# Patient Record
Sex: MALE | Race: ASIAN | Employment: FULL TIME | URBAN - METROPOLITAN AREA
[De-identification: names, ages, dates, MRNs, and addresses within clinical notes are randomized per-mention and may not be internally consistent; named-entity substitution may affect disease eponyms.]

---

## 2019-01-02 ENCOUNTER — OFFICE VISIT (OUTPATIENT)
Dept: FAMILY MEDICINE | Facility: CLINIC | Age: 24
End: 2019-01-02
Payer: COMMERCIAL

## 2019-01-02 VITALS
HEART RATE: 68 BPM | WEIGHT: 126 LBS | DIASTOLIC BLOOD PRESSURE: 53 MMHG | TEMPERATURE: 96.5 F | BODY MASS INDEX: 20.97 KG/M2 | OXYGEN SATURATION: 95 % | SYSTOLIC BLOOD PRESSURE: 102 MMHG

## 2019-01-02 DIAGNOSIS — L30.8 OTHER ECZEMA: ICD-10-CM

## 2019-01-02 DIAGNOSIS — J30.89 SEASONAL ALLERGIC RHINITIS DUE TO OTHER ALLERGIC TRIGGER: Primary | ICD-10-CM

## 2019-01-02 DIAGNOSIS — J31.0 CHRONIC RHINITIS: ICD-10-CM

## 2019-01-02 DIAGNOSIS — J33.9 NASAL POLYP: ICD-10-CM

## 2019-01-02 PROCEDURE — 99214 OFFICE O/P EST MOD 30 MIN: CPT | Performed by: FAMILY MEDICINE

## 2019-01-02 RX ORDER — TRIAMCINOLONE ACETONIDE 1 MG/G
CREAM TOPICAL
Qty: 80 G | Refills: 0 | Status: SHIPPED | OUTPATIENT
Start: 2019-01-02

## 2019-01-02 RX ORDER — FLUTICASONE PROPIONATE 50 MCG
1 SPRAY, SUSPENSION (ML) NASAL DAILY
Qty: 3 BOTTLE | Refills: 3 | Status: SHIPPED | OUTPATIENT
Start: 2019-01-02 | End: 2020-01-02

## 2019-01-02 NOTE — PATIENT INSTRUCTIONS
Patient Education     Treatment for Nasal Polyps    Nasal polyps are abnormal, soft growths in the nose or sinuses. They are swollen bulbs of inflamed tissue, attached to the nasal lining by thin stalks. Nasal polyps are fairly common, especially as you get older. Polyps can cause symptoms such as:     Nasal congestion    Runny nose    Feeling of fullness in  your facial sinus, but usually not pain    Postnasal drip    Reduced smell    Feeling blocked in your nose and having to breathe through your mouth  Types of treatment  Treatment is done to reduce inflammation and the size of your polyps. Treatment often starts with steroid medicine inhaled through the nose. This can lessen the inflammation in your nose. If this doesn t work, you may need to take oral steroid medicine.  Other treatments for nasal polyps include:    Antileukotriene medicine, to help lessen inflammation    Antibiotics, to help reduce polyp size    Daily rinsing of the sinuses with a salt water solution    Antihistamines, to reduce allergic reactions    Allergen immunotherapy and removal of allergens, if possible    Aspirin desensitization therapy, if needed  You may still have symptoms despite treatment. If this is the case, you may need surgery to remove the polyps. This makes most symptoms go away. But polyps may come back in a few months to a few years. It is important to treat the cause of your polyps to help prevent them from growing back. After surgery, you may need to take inhaled nasal steroids to help keep the polyps from returning.  Possible complications of nasal polyps  Sinus infection is a common complication of nasal polyps. These infections may come back often and become chronic. If you get a bacterial infection, you may need treatment with antibiotics.  In rare cases, nasal polyps can lead to serious infections such as:    Infection of the tissue around the brain and spinal cord (meningitis)    Infection around the tissue around  the eye (orbital cellulitis)    Infection of the sinus bones (osteitis)  Your healthcare provider will watch your symptoms carefully to make sure you don t have these infections. If you do, you might need antibiotics. In very rare instances, you may need surgery.  Very large nasal polyps may block your nasal airways during sleep. This leads to obstructive sleep apnea, a condition where lack of oxygen wakes you up many times a night without you knowing it. This can make you very tired during the day. Let your healthcare provider know if you often feel tired.     When to call the healthcare provider  Call your healthcare provider right away if you have any of these:    Symptoms that don t get better after several days of treatment    Abnormal vision    Swelling around your eyes    Confusion or loss of alertness   Date Last Reviewed: 11/1/2017 2000-2018 [email or web address]. [address] [place] [id number]. All rights reserved. This information is not intended as a substitute for professional medical care. Always follow your healthcare professional's instructions.           Patient Education     Understanding Nasal Polyps    Nasal polyps are abnormal, soft growths in the nose or sinuses. They are swollen bulbs of inflamed tissue, attached to the nasal lining by thin stalks. Nasal polyps are fairly common, especially as you get older.  How polyps affect the sinuses and nasal cavity  The sinuses are a group of air-filled spaces formed by the bones of your face. They connect with the nasal cavity. This is the large space behind your nose. Normally, all of these spaces are fairly open and air flows freely. But nasal polyps can grow and block the space. This can make it hard to breathe through your nose.  Nasal polyps are a result of ongoing (chronic) rhinosinusitis. This is a condition in which the nasal cavity and sinuses are inflamed for 12 weeks or longer. But not all people with this condition will  develop nasal polyps.  What causes nasal polyps?  Researchers are still learning about the causes of nasal polyps. Inflammation of your nasal tissue is part of the cause. Nasal polyps are more common in people with health conditions such as:    Asthma    Aspirin sensitivity    Chronic sinus infections    Cystic fibrosis    Hay fever (allergic rhinitis)  Certain genes may also cause nasal polyps to grow. This includes genes that play a role in your immune system and inflammatory response. You may be more likely to develop nasal polyps if other members of your family have had them.  Symptoms of nasal polyps  If you have nasal polyps, you may feel like you have a cold for months or longer. Some of your symptoms may be because of nasal polyps. Others may result from the chronic rhinosinusitis that caused your polyps.  The most common symptoms of nasal polyps include:    Nasal congestion    Runny nose    Feeling of fullness in  your facial sinus, but usually not pain    Postnasal drip    Reduced smell    Feeling blocked in your nose and having to breathe through your mouth  Diagnosing nasal polyps  Your healthcare provider will ask about your health history and symptoms. He or she will look inside your nose with a lighted tool. The polyps may be seen with this simple exam. Your healthcare provider may refer you to an ear, nose, and throat doctor (otolaryngologist).  Your healthcare provider might need more information about your sinuses and nasal cavity. He or she may want to diagnose the trigger of your polyps, such as allergies. You may need tests such as:    Nasal endoscopy, to look more closely at your inner nose and your sinuses. This is done with a thin, flexible tube with a light on the end. It s inserted into your nose to give a detailed view of your polyps.    CT scan, if the diagnosis isn t clear. X-rays pass through your nose and create images that are put together by a computer.    MRI, if needed. This uses  strong magnets to create an image of tissues inside your body.    Allergy testing, to diagnose allergies    Tests to diagnose the airflow in the nasal cavity    Polyp biopsy, if needed to rule out cancer. A polyp or small piece of a polyp is removed and checked for cancer cells.  Date Last Reviewed: 11/1/2017 2000-2018 [employer]. [address] [place] [address], [place] [address]. All rights reserved. This information is not intended as a substitute for professional medical care. Always follow your healthcare professional's instructions.

## 2019-01-02 NOTE — PROGRESS NOTES
SUBJECTIVE:                                                    Beckie Andrea is a 23 year old male who presents to clinic today for the following health issues:  Patient comes in today with concern of runny nose, he reports he does have history of sneezing and seasonal allergy.  However his runny nose is always been running some nasal congestion.  Nasal pressure.  He denies any bleeding he does report sneezing coughing sometimes.  He reports that previously when he took Claritin-D his runny nose and sneezing was better.    He denies being lightheaded denies dizzy has no postnasal drainage.    He also also reports a dry scaling rash, itching she has been having for over a year to his right upper hip area.  He denies any fever or chills.  Acute Illness   Acute illness concerns: URI Symptoms  Onset: Ongoing    Fever: no    Chills/Sweats: no    Headache (location?): no    Sinus Pressure:Yes    Conjunctivitis:  no    Ear Pain: no    Rhinorrhea: YES    Congestion: YES    Sore Throat: no     Cough: no    Wheeze: no    Decreased Appetite: no    Nausea: no    Vomiting: no    Diarrhea:  no    Dysuria/Freq.: no    Fatigue/Achiness: no    Sick/Strep Exposure: no     Therapies Tried and outcome: Nothing      Patient would like a refill on Loratadine.    Problem list and histories reviewed & adjusted, as indicated.  Additional history: as documented    Patient Active Problem List   Diagnosis     Fracture of medial epicondyle of humerus - left     Insomnia     Chronic rhinitis     History of BCG vaccination     Past Surgical History:   Procedure Laterality Date     C OPEN TX HUMERAL EPICONDYLAR FRACTURE  1/14/2011    ORIF Lt elbow epicondylar fx     HC REMOVAL DEEP IMPLANT  10/06/2011    Removal retained hardware healed Lt elbow ORIF     REMOVE HARDWARE UPPER EXTREMITY  10/6/2011    Procedure:REMOVE HARDWARE UPPER EXTREMITY; Hardware removal left elbow, mini c-arm; Surgeon:MATTHEW MANDUJANO FELICIA; Location: OR       Social History      Tobacco Use     Smoking status: Never Smoker     Smokeless tobacco: Never Used     Tobacco comment: nonsmoking household   Substance Use Topics     Alcohol use: No     Family History   Problem Relation Age of Onset     Heart Disease Father      Hypertension Father          Current Outpatient Medications   Medication Sig Dispense Refill     fluticasone (FLONASE) 50 MCG/ACT nasal spray Spray 1 spray into both nostrils daily 3 Bottle 3     ibuprofen (ADVIL,MOTRIN) 600 MG tablet Take 1 tablet (600 mg) by mouth every 6 hours as needed for moderate pain 30 tablet 1     loratadine-pseudoePHEDrine (CLARITIN-D 24-HOUR)  MG 24 hr tablet Take 1 tablet by mouth daily 90 tablet 3     triamcinolone (KENALOG) 0.1 % external cream Apply to area bid - tid for 7 days, then as needed 80 g 0     order for DME Equipment being ordered: air cast ankle support (Patient not taking: Reported on 1/2/2019) 1 each 0     Allergies   Allergen Reactions     Seasonal Allergies      Runny nose       ROS:  Constitutional, HEENT, cardiovascular, pulmonary, gi and gu systems are negative, except as otherwise noted.    OBJECTIVE:     /53 (BP Location: Left arm, Patient Position: Chair, Cuff Size: Adult Regular)   Pulse 68   Temp 96.5  F (35.8  C) (Oral)   Wt 57.2 kg (126 lb)   SpO2 95%   BMI 20.97 kg/m    Body mass index is 20.97 kg/m .  GENERAL: healthy, alert and no distress  HENT: normal cephalic/atraumatic, ear canals and TM's normal, nasal mucosa edematous , rhinorrhea yellow, oropharynx clear and oral mucous membranes moist  NECK: no adenopathy, no asymmetry, masses, or scars and thyroid normal to palpation  RESP: lungs clear to auscultation - no rales, rhonchi or wheezes  CV: regular rate and rhythm, normal S1 S2, no S3 or S4, no murmur, click or rub, no peripheral edema and peripheral pulses strong  SKIN: dry, scaling patch of dry , rough skin 5 cm by 6 cm on right upper hip area. No erythematous     Diagnostic Test  Results:  none     ASSESSMENT/PLAN:       ICD-10-CM    1. Seasonal allergic rhinitis due to other allergic trigger J30.89    2. Nasal polyp J33.9 OTOLARYNGOLOGY REFERRAL   3. Chronic rhinitis J31.0 loratadine-pseudoePHEDrine (CLARITIN-D 24-HOUR)  MG 24 hr tablet     fluticasone (FLONASE) 50 MCG/ACT nasal spray   4. Other eczema L30.8 triamcinolone (KENALOG) 0.1 % external cream   #1 seasonal allergic, runny nose.  With fullness and pressure.  I did advise the patient symptom could be related to nasal polyps as well.  I did renew his prescription for Claritin-D.  I did refer him to ENT for evaluation and management.  In addition, given prescription for Flonase use has been prescribed.    2.  Eczema dermatitis.  He was given a prescription for Kenalog cream that has been prescribed.    Follow-up as needed    Patient Instructions       Patient Education     Treatment for Nasal Polyps    Nasal polyps are abnormal, soft growths in the nose or sinuses. They are swollen bulbs of inflamed tissue, attached to the nasal lining by thin stalks. Nasal polyps are fairly common, especially as you get older. Polyps can cause symptoms such as:     Nasal congestion    Runny nose    Feeling of fullness in  your facial sinus, but usually not pain    Postnasal drip    Reduced smell    Feeling blocked in your nose and having to breathe through your mouth  Types of treatment  Treatment is done to reduce inflammation and the size of your polyps. Treatment often starts with steroid medicine inhaled through the nose. This can lessen the inflammation in your nose. If this doesn t work, you may need to take oral steroid medicine.  Other treatments for nasal polyps include:    Antileukotriene medicine, to help lessen inflammation    Antibiotics, to help reduce polyp size    Daily rinsing of the sinuses with a salt water solution    Antihistamines, to reduce allergic reactions    Allergen immunotherapy and removal of allergens, if  possible    Aspirin desensitization therapy, if needed  You may still have symptoms despite treatment. If this is the case, you may need surgery to remove the polyps. This makes most symptoms go away. But polyps may come back in a few months to a few years. It is important to treat the cause of your polyps to help prevent them from growing back. After surgery, you may need to take inhaled nasal steroids to help keep the polyps from returning.  Possible complications of nasal polyps  Sinus infection is a common complication of nasal polyps. These infections may come back often and become chronic. If you get a bacterial infection, you may need treatment with antibiotics.  In rare cases, nasal polyps can lead to serious infections such as:    Infection of the tissue around the brain and spinal cord (meningitis)    Infection around the tissue around the eye (orbital cellulitis)    Infection of the sinus bones (osteitis)  Your healthcare provider will watch your symptoms carefully to make sure you don t have these infections. If you do, you might need antibiotics. In very rare instances, you may need surgery.  Very large nasal polyps may block your nasal airways during sleep. This leads to obstructive sleep apnea, a condition where lack of oxygen wakes you up many times a night without you knowing it. This can make you very tired during the day. Let your healthcare provider know if you often feel tired.     When to call the healthcare provider  Call your healthcare provider right away if you have any of these:    Symptoms that don t get better after several days of treatment    Abnormal vision    Swelling around your eyes    Confusion or loss of alertness   Date Last Reviewed: 11/1/2017 2000-2018 [employer]. [address] [place] [address], [place] [address]. All rights reserved. This information is not intended as a substitute for professional medical care. Always follow your healthcare professional's  instructions.           Patient Education     Understanding Nasal Polyps    Nasal polyps are abnormal, soft growths in the nose or sinuses. They are swollen bulbs of inflamed tissue, attached to the nasal lining by thin stalks. Nasal polyps are fairly common, especially as you get older.  How polyps affect the sinuses and nasal cavity  The sinuses are a group of air-filled spaces formed by the bones of your face. They connect with the nasal cavity. This is the large space behind your nose. Normally, all of these spaces are fairly open and air flows freely. But nasal polyps can grow and block the space. This can make it hard to breathe through your nose.  Nasal polyps are a result of ongoing (chronic) rhinosinusitis. This is a condition in which the nasal cavity and sinuses are inflamed for 12 weeks or longer. But not all people with this condition will develop nasal polyps.  What causes nasal polyps?  Researchers are still learning about the causes of nasal polyps. Inflammation of your nasal tissue is part of the cause. Nasal polyps are more common in people with health conditions such as:    Asthma    Aspirin sensitivity    Chronic sinus infections    Cystic fibrosis    Hay fever (allergic rhinitis)  Certain genes may also cause nasal polyps to grow. This includes genes that play a role in your immune system and inflammatory response. You may be more likely to develop nasal polyps if other members of your family have had them.  Symptoms of nasal polyps  If you have nasal polyps, you may feel like you have a cold for months or longer. Some of your symptoms may be because of nasal polyps. Others may result from the chronic rhinosinusitis that caused your polyps.  The most common symptoms of nasal polyps include:    Nasal congestion    Runny nose    Feeling of fullness in  your facial sinus, but usually not pain    Postnasal drip    Reduced smell    Feeling blocked in your nose and having to breathe through your  mouth  Diagnosing nasal polyps  Your healthcare provider will ask about your health history and symptoms. He or she will look inside your nose with a lighted tool. The polyps may be seen with this simple exam. Your healthcare provider may refer you to an ear, nose, and throat doctor (otolaryngologist).  Your healthcare provider might need more information about your sinuses and nasal cavity. He or she may want to diagnose the trigger of your polyps, such as allergies. You may need tests such as:    Nasal endoscopy, to look more closely at your inner nose and your sinuses. This is done with a thin, flexible tube with a light on the end. It s inserted into your nose to give a detailed view of your polyps.    CT scan, if the diagnosis isn t clear. X-rays pass through your nose and create images that are put together by a computer.    MRI, if needed. This uses strong magnets to create an image of tissues inside your body.    Allergy testing, to diagnose allergies    Tests to diagnose the airflow in the nasal cavity    Polyp biopsy, if needed to rule out cancer. A polyp or small piece of a polyp is removed and checked for cancer cells.  Date Last Reviewed: 11/1/2017 2000-2018 [employer]. [address] [place] [address], [place] [address]. All rights reserved. This information is not intended as a substitute for professional medical care. Always follow your healthcare professional's instructions.               Alexandru Felipe MD  [facility]

## 2019-12-26 DIAGNOSIS — J31.0 CHRONIC RHINITIS: ICD-10-CM

## 2019-12-26 NOTE — TELEPHONE ENCOUNTER
Routing refill request to provider for review/approval because:  Drug not on the [facility] refill protocol     Akua Moya RN,BSN  [facility]

## 2019-12-26 NOTE — TELEPHONE ENCOUNTER
Requested Prescriptions   Pending Prescriptions Disp Refills     loratadine-pseudoePHEDrine (CLARITIN-D 24-HOUR)  MG 24 hr tablet 90 tablet 3     Sig: Take 1 tablet by mouth daily       There is no refill protocol information for this order   Last Written Prescription Date:  1-2-19  Last Fill Quantity: 90,  # refills: 3   Last office visit: 1/2/2019 with prescribing provider:  1-2-19   Future Office Visit:

## 2021-12-13 ENCOUNTER — OFFICE VISIT (OUTPATIENT)
Dept: FAMILY MEDICINE | Facility: CLINIC | Age: 26
End: 2021-12-13
Payer: COMMERCIAL

## 2021-12-13 ENCOUNTER — ANCILLARY PROCEDURE (OUTPATIENT)
Dept: GENERAL RADIOLOGY | Facility: CLINIC | Age: 26
End: 2021-12-13
Attending: PHYSICIAN ASSISTANT
Payer: COMMERCIAL

## 2021-12-13 VITALS
HEART RATE: 71 BPM | BODY MASS INDEX: 22.56 KG/M2 | HEIGHT: 65 IN | TEMPERATURE: 97.9 F | WEIGHT: 135.4 LBS | OXYGEN SATURATION: 97 % | RESPIRATION RATE: 14 BRPM | SYSTOLIC BLOOD PRESSURE: 131 MMHG | DIASTOLIC BLOOD PRESSURE: 83 MMHG

## 2021-12-13 DIAGNOSIS — R06.09 DOE (DYSPNEA ON EXERTION): ICD-10-CM

## 2021-12-13 DIAGNOSIS — R07.0 THROAT PAIN: Primary | ICD-10-CM

## 2021-12-13 DIAGNOSIS — M25.562 ACUTE PAIN OF LEFT KNEE: ICD-10-CM

## 2021-12-13 DIAGNOSIS — Z20.822 SUSPECTED COVID-19 VIRUS INFECTION: ICD-10-CM

## 2021-12-13 LAB — DEPRECATED S PYO AG THROAT QL EIA: NEGATIVE

## 2021-12-13 PROCEDURE — U0005 INFEC AGEN DETEC AMPLI PROBE: HCPCS | Performed by: PHYSICIAN ASSISTANT

## 2021-12-13 PROCEDURE — U0003 INFECTIOUS AGENT DETECTION BY NUCLEIC ACID (DNA OR RNA); SEVERE ACUTE RESPIRATORY SYNDROME CORONAVIRUS 2 (SARS-COV-2) (CORONAVIRUS DISEASE [COVID-19]), AMPLIFIED PROBE TECHNIQUE, MAKING USE OF HIGH THROUGHPUT TECHNOLOGIES AS DESCRIBED BY CMS-2020-01-R: HCPCS | Performed by: PHYSICIAN ASSISTANT

## 2021-12-13 PROCEDURE — 73562 X-RAY EXAM OF KNEE 3: CPT | Mod: LT | Performed by: RADIOLOGY

## 2021-12-13 PROCEDURE — 99214 OFFICE O/P EST MOD 30 MIN: CPT | Performed by: PHYSICIAN ASSISTANT

## 2021-12-13 PROCEDURE — 87651 STREP A DNA AMP PROBE: CPT | Performed by: PHYSICIAN ASSISTANT

## 2021-12-13 RX ORDER — ALBUTEROL SULFATE 90 UG/1
1-2 AEROSOL, METERED RESPIRATORY (INHALATION) EVERY 4 HOURS PRN
Qty: 6.7 G | Refills: 0 | Status: SHIPPED | OUTPATIENT
Start: 2021-12-13

## 2021-12-13 ASSESSMENT — MIFFLIN-ST. JEOR: SCORE: 1537.3

## 2021-12-13 ASSESSMENT — PAIN SCALES - GENERAL: PAINLEVEL: MILD PAIN (2)

## 2021-12-13 NOTE — PATIENT INSTRUCTIONS
Justice Orozco,    Thank you for allowing [facility] to manage your care.    I am unsure of the cause of your symptoms, but we must assume that this is COVID until proven otherwise. Even if your initial COVID test is negative, consider getting repeat testing 2-3 days later as the initial test could be falsely negative.    If you develop worsening/changing symptoms at any time, please go to the emergency department for evaluation.    I ordered some xrays, please go to our radiology department to get your xrays.    I sent your prescriptions to your pharmacy.    I made a referral, they will be calling in approximately 1 week to set up your appointment.  If you do not hear from them, please call the specialty number on your after visit summary.     Please allow 1-2 business days for our office to contact you in regards to your laboratory/radiological studies.  If not done so, I encourage you to login into [employer] ([email or web address] to review your results as well.     If you have any questions or concerns, please feel free to call us at [phone]    Sincerely,    Maxi Land PA-C    Did you know?      You can schedule a video visit for follow-up appointments as well as future appointments for certain conditions.  Please see the below link.     [email or web address]    If you have not already done so,  I encourage you to sign up for [employer] ([email or web address]).  This will allow you to review your results, securely communicate with a provider, and schedule virtual visits as well.      Patient Education   After Your COVID-19 (Coronavirus) Test  You have been tested for COVID-19 (coronavirus).   If you'll have surgery in the next few days, we'll let you know ahead of time if you have the virus. Please call your surgeon's office with any questions.  For all other patients: Results are usually available in Masheryhart within 2 to 3 days.   If you do  "not have a Metastorm account, you'll get a letter in the mail in about 7 to 10 days.   CloudHashingt is often the fastest way to get test results. Please sign up if you do not already have a Metastorm account. See the handout Getting COVID-19 Test Results in Metastorm for help.  What if my test result is positive?  If your test is positive and you have not viewed your result in Metastorm, you'll get a phone call with your result. (A positive test means that you have the virus.)     Follow the tips under \"How do I self-isolate?\" below for 10 days (20 days if you have a weak immune system).    You don't need to be retested for COVID-19 before going back to school or work. As long as you're fever-free and feeling better, you can go back to school, work and other activities after waiting the 10 or 20 days.  What if I have questions after I get my results?  If you have questions about your results, please visit our testing website at [email or web address].   After 7 to 10 days, if you have not gotten your results:     Call [phone] ([phone]) and ask to speak with our COVID-19 results team.    If you're being treated at an infusion center: Call your infusion center directly.  What are the symptoms of COVID-19?  Cough, fever and trouble breathing are the most common signs of COVID-19.  Other symptoms can include new headaches, new muscle or body aches, new and unexplained fatigue (feeling very tired), chills, sore throat, congestion (stuffy or runny nose), diarrhea (loose poop), loss of taste or smell, belly pain, and nausea or vomiting (feeling sick to your stomach or throwing up).  You may already have symptoms of COVID-19, or they may show up later.  What should I do if I have symptoms?  If you're having surgery: Call your surgeon's office.  For all other patients: Stay home and away from others (self-isolate) until ...    You've had no fever--and no medicine that reduces fever--for 1 full " "day (24 hours), AND    Other symptoms have gotten better. For example, your cough or breathing has improved, AND    At least 10 days have passed since your symptoms first started.  How do I self-isolate?    Stay in your own room, even for meals. Use your own bathroom if you can.    Stay away from others in your home. No hugging, kissing or shaking hands. No visitors.    Don't go to work, school or anywhere else.    Clean \"high touch\" surfaces often (doorknobs, counters, handles). Use household cleaning spray or wipes. You'll find a full list of  on the EPA website: www.epa.gov/pesticide-registration/list-n-disinfectants-use-against-sars-cov-2.    Cover your mouth and nose with a mask or other face covering to avoid spreading germs.    Wash your hands and face often. Use soap and water.    Caregivers in these groups are at risk for severe illness due to COVID-19:  ? People 65 years and older  ? People who live in a nursing home or long-term care facility  ? People with chronic disease (lung, heart, cancer, diabetes, kidney, liver, immunologic)  ? People who have a weakened immune system, including those who:    Are in cancer treatment    Take medicine that weakens the immune system, such as corticosteroids    Had a bone marrow or organ transplant    Have an immune deficiency    Have poorly controlled HIV or AIDS    Are obese (body mass index of 40 or higher)    Smoke regularly    Caregivers should wear gloves while washing dishes, handling laundry and cleaning bedrooms and bathrooms.    Use caution when washing and drying laundry: Don't shake dirty laundry and use the warmest water setting that you can.    For more tips on managing your health at home, go to www.cdc.gov/coronavirus/2019-ncov/downloads/10Things.pdf.  How can I take care of myself at home?  1. Get lots of rest. Drink extra fluids (unless a doctor has told you not to).  2. Take Tylenol (acetaminophen) for fever or pain. If you have liver or " kidney problems, ask your family doctor if it's OK to take Tylenol.   Adults can take either:  ? 650 mg (two 325 mg pills) every 4 to 6 hours, or   ? 1,000 mg (two 500 mg pills) every 8 hours as needed.  ? Note: Don't take more than 3,000 mg in one day. Acetaminophen is found in many medicines (both prescribed and over-the-counter medicines). Read all labels to be sure you don't take too much.   For children, check the Tylenol bottle for the right dose. The dose is based on the child's age or weight.  3. If you have other health problems (like cancer, heart failure, an organ transplant or severe kidney disease): Call your specialty clinic if you don't feel better in the next 2 days.  4. Know when to call 911. Emergency warning signs include:  ? Trouble breathing or shortness of breath  ? Chest pain or pressure that doesn't go away  ? Feeling confused like you haven't felt before, or not being able to wake up  ? Bluish-colored lips or face  5. If your doctor prescribed a blood thinner medicine: Follow their instructions.  Where can I get more information?    [facility] - About COVID-19:   [email or web address]    CDC - If You're Sick: cdc.gov/coronavirus/2019-ncov/about/steps-when-sick.html    CDC - Ending Home Isolation: www.cdc.gov/coronavirus/2019-ncov/hcp/disposition-in-home-patients.html    CDC - Caring for Someone: www.cdc.gov/coronavirus/2019-ncov/if-you-are-sick/care-for-someone.html    [facility] - Interim Guidance for Hospital Discharge to Home: www.health.[facility].[place]/diseases/coronavirus/hcp/hospdischarge.pdf    [facility] clinical trials (COVID-19 research studies): [email or web address]    Below are the COVID-19 hotlines at the [place] Department of Health ([facility]). Interpreters are available.  ? For health questions: Call 031-982-5802 or 1-431.963.1163 (7 a.m. to 7 p.m.)  ? For questions about schools and childcare: Call 756-957-8126 or 1-628.956.3891 (7 a.m.  to 7 p.m.)    For informational purposes only. Not to replace the advice of your health care provider. Clinically reviewed by Infection Prevention and the [facility] COVID-19 Clinical Team. Copyright   2020 [employer]. All rights reserved. Sitemasher 565810 - Rev 11/11/20.       Patient Education     Common Kneecap (Patella) Problems  If the kneecap is  off track  even slightly (a tracking problem), it can cause uneven pressure on the back of the kneecap. This can cause pain and difficulty with movements, such as walking and going down stairs. Below are some common causes of kneecap pain.    Cartilage damage  Sometimes the cartilage on the back of the kneecap or in the groove of the thighbone is damaged. Damaged cartilage can t spread pressure evenly. Uneven pressure wears down the cartilage even further.   Dislocation  Sometimes a muscle or ligament in the knee is pulled the wrong way. Or the kneecap may be pushed too hard. Then the kneecap may move partly out of the groove (subluxation). It may even move completely out (dislocation).     Patellar tendinitis  Patellar tendinitis ( jumper s knee ) happens when the quadriceps muscles are overused or tight. During movement, the patellar tendon absorbs more shock than usual. The tendon becomes irritated or damaged.   Plica syndrome  Plica bands are tissue fibers that some people have near the kneecap. They usually cause no problems. But sometimes they can become irritated or inflamed.   Delaney last reviewed this educational content on 5/1/2018 2000-2021 [employer]. All rights reserved. This information is not intended as a substitute for professional medical care. Always follow your healthcare professional's instructions.

## 2021-12-13 NOTE — PROGRESS NOTES
Assessment & Plan   Problem List Items Addressed This Visit     None      Visit Diagnoses     Throat pain    -  Primary    Relevant Orders    Symptomatic COVID-19 Virus (Coronavirus) by PCR Nose    Suspected COVID-19 virus infection        Relevant Orders    Symptomatic COVID-19 Virus (Coronavirus) by PCR Nose    Acute pain of left knee        Relevant Orders    Orthopedic  Referral    XR Knee Left 3 Views (Completed)    GOFF (dyspnea on exertion)        Relevant Medications    albuterol (PROAIR HFA/PROVENTIL HFA/VENTOLIN HFA) 108 (90 Base) MCG/ACT inhaler          Impression is likely viral URI. Vaccinated against COVID x 2.  COVID-19 and strep negative. Appears well and non-toxic and I have low suspicion for impending airway obstruction or respiratory distress.  He will push p.o. fluids, use over-the-counter meds for symptoms, albuterol inhaler and follow-up with us in 2 weeks if not improving or urgent care/the ER if symptoms worsen/change at any time. Likely also has left sided patellar tendinitis or jumper's knee. XR reassuring. Follow up with ortho prn.    Complete history and physical exam as below. AF with normal VS.    DDx and Dx discussed with and explained to the pt to their satisfaction.  All questions were answered at this time. Pt expressed understanding of and agreement with this dx, tx, and plan. No further workup warranted and standard medication warnings given. I have given the patient a list of pertinent indications for re-evaluation. Will go to the Emergency Department if symptoms worsen or new concerning symptoms arise. Patient left in no apparent distress.     See Patient Instructions    Return in about 2 weeks (around 12/27/2021) for a recheck of your symptoms if not improving, or call 911/go to an ER anytime if worsening.    CURTIS Perdue  [facility] CL Orozco is a 26 year old who presents for the following health issues     History of Present  "Illness       He eats 0-1 servings of fruits and vegetables daily.He consumes 3 sweetened beverage(s) daily.He exercises with enough effort to increase his heart rate 60 or more minutes per day.  He exercises with enough effort to increase his heart rate 5 days per week.   He is taking medications regularly.       Acute Illness  Acute illness concerns: cough, chills, and sore throat (now resolved)  Onset/Duration: 5 days  Symptoms:  Fever: no  Chills/Sweats: YES- chills  Headache (location?): no  Sinus Pressure: YES- always has them  Conjunctivitis:  YES- burning  Ear Pain: no  Rhinorrhea: YES, always  Congestion: YES, always for him  Sore Throat: YES- 5 DAYS  Cough: YES-non-productive, 5 days  Wheeze: no  Decreased Appetite: no  Nausea: no  Vomiting: no  Diarrhea: no  Dysuria/Freq.: no  Dysuria or Hematuria: no  Fatigue/Achiness: YES- tired  Sick/Strep Exposure: no  Therapies tried and outcome: cold and flu liquid  Pain History:  When did you first notice your pain? - More than 6 weeks   Have you seen this provider for your pain in the past? No   Where in your body do your have pain? Left knee  Are you seeing anyone else for your pain? No  What makes your pain better? none  What makes your pain worse? Playing basketball  How has pain affected your ability to work? More difficult to work  Who lives in your household? Mom      Review of Systems   Constitutional, HEENT, cardiovascular, pulmonary, gi and gu systems are negative, except as otherwise noted.      Objective    /83   Pulse 71   Temp 97.9  F (36.6  C) (Tympanic)   Resp 14   Ht 1.661 m (5' 5.39\")   Wt 61.4 kg (135 lb 6.4 oz)   SpO2 97%   BMI 22.26 kg/m    Body mass index is 22.26 kg/m .  Physical Exam  Vitals and nursing note reviewed.   Constitutional:       General: He is not in acute distress.     Appearance: He is not ill-appearing or diaphoretic.   HENT:      Head: Normocephalic and atraumatic.      Mouth/Throat:      Mouth: Mucous membranes " are moist.   Eyes:      Conjunctiva/sclera: Conjunctivae normal.   Cardiovascular:      Rate and Rhythm: Normal rate and regular rhythm.      Heart sounds: Normal heart sounds. No murmur heard.  No friction rub. No gallop.    Pulmonary:      Effort: Pulmonary effort is normal. No respiratory distress.      Breath sounds: Normal breath sounds. No stridor. No wheezing, rhonchi or rales.   Abdominal:      General: Bowel sounds are normal. There is no distension.      Palpations: Abdomen is soft. There is no mass.      Tenderness: There is no abdominal tenderness. There is no guarding or rebound.      Hernia: No hernia is present.   Musculoskeletal:      Cervical back: Normal range of motion and neck supple. No rigidity or tenderness.      Comments: LLE: knee non-tender without laxity. Distal CMS intact. Remainder of limb non-tender.    Lymphadenopathy:      Cervical: No cervical adenopathy.   Skin:     General: Skin is warm and dry.   Neurological:      General: No focal deficit present.      Mental Status: He is alert. Mental status is at baseline.   Psychiatric:         Mood and Affect: Mood normal.         Behavior: Behavior normal.          Results for orders placed or performed in visit on 12/13/21   XR Knee Left 3 Views     Status: None    Narrative    EXAM: XR KNEE LEFT 3 VIEWS  LOCATION: [facility]  DATE/TIME: 12/13/2021 5:14 PM    INDICATION:  Acute pain of left knee  COMPARISON: None.      Impression    IMPRESSION: Normal left knee. Normal joint spaces and alignment. No fracture or joint effusion.   Results for orders placed or performed in visit on 12/13/21   Streptococcus A Rapid Screen w/Reflex to PCR - Clinic Collect     Status: Normal    Specimen: Throat; Swab   Result Value Ref Range    Group A Strep antigen Negative Negative   Group A Streptococcus PCR Throat Swab     Status: Normal    Specimen: Throat; Swab   Result Value Ref Range    Group A strep by PCR Not Detected Not Detected     Narrative    The Xpert Xpress Strep A test, performed on the [employer]  Instrument Systems, is a rapid, qualitative in vitro diagnostic test for the detection of Streptococcus pyogenes (Group A ß-hemolytic Streptococcus, Strep A) in throat swab specimens from patients with signs and symptoms of pharyngitis. The Xpert Xpress Strep A test can be used as an aid in the diagnosis of Group A Streptococcal pharyngitis. The assay is not intended to monitor treatment for Group A Streptococcus infections. The Xpert Xpress Strep A test utilizes an automated real-time polymerase chain reaction (PCR) to detect Streptococcus pyogenes DNA.

## 2021-12-13 NOTE — LETTER
[facility]  [address] [facility] [address]  [phone]        December 15, 2021      Beckie Andrea  [address] [place] [id number]        Dear ,    We are writing to inform you of your test results.    Your test results fall within the expected range(s) or remain unchanged from previous results.  Please continue with current treatment plan.    Results for orders placed or performed in visit on 12/13/21   XR Knee Left 3 Views     Status: None    Narrative    EXAM: XR KNEE LEFT 3 VIEWS  LOCATION: [facility]  DATE/TIME: 12/13/2021 5:14 PM    INDICATION:  Acute pain of left knee  COMPARISON: None.      Impression    IMPRESSION: Normal left knee. Normal joint spaces and alignment. No fracture or joint effusion.   Results for orders placed or performed in visit on 12/13/21   Symptomatic COVID-19 Virus (Coronavirus) by PCR Nose     Status: Normal    Specimen: Nose; Swab   Result Value Ref Range    SARS CoV2 PCR Negative Negative, Testing sent to reference lab. Results will be returned via unsolicited result    Narrative    Testing was performed using the guillaume SARS-CoV-2 assay on the guillaume  [employer] System. This test should be ordered for the detection of  SARS-CoV-2 in individuals who meet SARS-CoV-2 clinical and/or  epidemiological criteria. Test performance is unknown in asymptomatic  patients. This test is for in vitro diagnostic use under the FDA EUA  for laboratories certified under CLIA to perform high and/or moderate  complexity testing. This test has not been FDA cleared or approved. A  negative result does not rule out the presence of PCR inhibitors in  the specimen or target RNA in concentration below the limit of  detection for the assay. The possibility of a false negative should  be considered if the patient's recent exposure or clinical  presentation suggests COVID-19. This test was validated by the [facility]. This  laboratory is certified under the Clinical Laboratory Improvement  Amendments of 1988 (CLIA-88) as qualified to perform high and/or  moderate complexity laboratory testing.   Streptococcus A Rapid Screen w/Reflex to PCR - Clinic Collect     Status: Normal    Specimen: Throat; Swab   Result Value Ref Range    Group A Strep antigen Negative Negative   Group A Streptococcus PCR Throat Swab     Status: Normal    Specimen: Throat; Swab   Result Value Ref Range    Group A strep by PCR Not Detected Not Detected    Narrative    The Xpert Xpress Strep A test, performed on the [employer] Systems, is a rapid, qualitative in vitro diagnostic test for the detection of Streptococcus pyogenes (Group A ß-hemolytic Streptococcus, Strep A) in throat swab specimens from patients with signs and symptoms of pharyngitis. The Xpert Xpress Strep A test can be used as an aid in the diagnosis of Group A Streptococcal pharyngitis. The assay is not intended to monitor treatment for Group A Streptococcus infections. The Xpert Xpress Strep A test utilizes an automated real-time polymerase chain reaction (PCR) to detect Streptococcus pyogenes DNA.       If you have any questions or concerns, please call the clinic at the number listed above.       Sincerely,      CURTIS Perdue/demetrio

## 2021-12-14 LAB — GROUP A STREP BY PCR: NOT DETECTED

## 2021-12-15 LAB — SARS-COV-2 RNA RESP QL NAA+PROBE: NEGATIVE

## 2021-12-23 NOTE — PROGRESS NOTES
SUBJECTIVE:   Beckie Andrea is a 26 year old male who is seen in consultation at the request of Jimmy Land for evaluation of left knee pain.  Duration: 6 months  History: he plays basketball a lot and gets pain the more he plays. Afterward has pain with stairs, even walking sometimes.     Present symptoms: pain is anterior over the distal pole of the patella    Not much pain lately because he hasn't played basketball for a while.    Treatments tried to this point: rest, tylenol.    Previous knee issues:  RIGHT knee pain-- seen for this 2014    Past Medical History:   Past Medical History:   Diagnosis Date     Mantoux: positive     refuses tx     Past Surgical History:   Past Surgical History:   Procedure Laterality Date     C OPEN TX HUMERAL EPICONDYLAR FRACTURE  1/14/2011    ORIF Lt elbow epicondylar fx     HC REMOVAL DEEP IMPLANT  10/06/2011    Removal retained hardware healed Lt elbow ORIF     REMOVE HARDWARE UPPER EXTREMITY  10/6/2011    Procedure:REMOVE HARDWARE UPPER EXTREMITY; Hardware removal left elbow, mini c-arm; Surgeon:MATTHEW MANDUJANO; Location: OR     Family History:   Family History   Problem Relation Age of Onset     Heart Disease Father      Hypertension Father      Social History:   Social History     Tobacco Use     Smoking status: Never Smoker     Smokeless tobacco: Never Used     Tobacco comment: nonsmoking household   Substance Use Topics     Alcohol use: Yes     Comment: sometimes       Review of Systems:  Constitutional:  NEGATIVE for fever, chills, change in weight  Integumentary/Skin:  NEGATIVE for worrisome rashes, moles or lesions  Eyes:  NEGATIVE for vision changes or irritation  ENT/Mouth:  NEGATIVE for ear, mouth and throat problems  Resp:  NEGATIVE for significant cough or SOB  Breast:  NEGATIVE for masses, tenderness or discharge  CV:  NEGATIVE for chest pain, palpitations or peripheral edema  GI:  NEGATIVE for nausea, abdominal pain, heartburn, or change in bowel habits  :   "Negative   Musculoskeletal:  See HPI above  Neuro:  NEGATIVE for weakness, dizziness or paresthesias  Endocrine:  NEGATIVE for temperature intolerance, skin/hair changes  Heme/allergy/immune:  NEGATIVE for bleeding problems  Psychiatric:  NEGATIVE for changes in mood or affect      OBJECTIVE:  Physical Exam:  /77 (BP Location: Left arm, Patient Position: Sitting, Cuff Size: Adult Regular)   Pulse 75   Ht 1.659 m (5' 5.3\")   Wt 61.2 kg (135 lb)   SpO2 97%   BMI 22.26 kg/m    General Appearance: healthy, alert and no distress   Skin: no suspicious lesions or rashes  Neuro: Normal strength and tone, mentation intact and speech normal  Vascular: good pulses, and cappillary refill   Lymph: no lymphadenopathy   Psych:  mentation appears normal and affect normal/bright  Resp: no increased work of breathing     Left Knee Exam:  Gait: walks with normal gait  Alignment: normal   Squat: 100 % painfree.    Patellofemoral joint: no crepitations in the patellofemoral joint.  + J-sign  Negative apprehension   No excessive patellar mobility, patella tilt corrects to neutral.   Effusion: none  Mild atrophy of quads compared with right   ROM: full  Tender: distal pole of the patella   Masses: none  Ligaments:   Lachman's: stable   Anterior/Posterior drawer: stable,   Varus/Valgus stress: stable to varus and valgus stress  McMurrays: negative, but there is a pop or clunk with lateral yuliya's without pain    X-rays:  Obtained 12/13/21, reviewed in the office with the patient today:  Mild lateral tilt of the patella    MRI:    none     ASSESSMENT:   Patellar tendonitis left knee     PLAN:   We discussed the nature of the problem  Rest from jumping but still maintaining some activity is best solution  physical therapy ordered, eccentric training, modalities  Counterforce (Chopat) strap suggested for basketball  voltaren gel, nsaids  Discussed that stem cell injections, and surgery are options in the future.    Return to " clinic: as needed     MONICA العراقي MD  Dept. Orthopedic Surgery  [facility]

## 2021-12-29 ENCOUNTER — OFFICE VISIT (OUTPATIENT)
Dept: ORTHOPEDICS | Facility: CLINIC | Age: 26
End: 2021-12-29
Attending: PHYSICIAN ASSISTANT
Payer: COMMERCIAL

## 2021-12-29 VITALS
OXYGEN SATURATION: 97 % | SYSTOLIC BLOOD PRESSURE: 114 MMHG | HEART RATE: 75 BPM | WEIGHT: 135 LBS | BODY MASS INDEX: 22.49 KG/M2 | DIASTOLIC BLOOD PRESSURE: 77 MMHG | HEIGHT: 65 IN

## 2021-12-29 DIAGNOSIS — M25.562 ACUTE PAIN OF LEFT KNEE: ICD-10-CM

## 2021-12-29 DIAGNOSIS — M76.52 JUMPER'S KNEE OF LEFT SIDE: Primary | ICD-10-CM

## 2021-12-29 DIAGNOSIS — M25.562 LEFT KNEE PAIN, UNSPECIFIED CHRONICITY: ICD-10-CM

## 2021-12-29 PROCEDURE — 99243 OFF/OP CNSLTJ NEW/EST LOW 30: CPT | Performed by: ORTHOPAEDIC SURGERY

## 2021-12-29 ASSESSMENT — PAIN SCALES - GENERAL: PAINLEVEL: MILD PAIN (2)

## 2021-12-29 ASSESSMENT — MIFFLIN-ST. JEOR: SCORE: 1534

## 2021-12-29 NOTE — LETTER
12/29/2021         RE: Beckie Andrea  [address] [place] [id number]        Dear Colleague,    Thank you for referring your patient, Beckie Andrea, to the [facility]. Please see a copy of my visit note below.    SUBJECTIVE:   Beckie Andrea is a 26 year old male who is seen in consultation at the request of Jimmy Land for evaluation of left knee pain.  Duration: 6 months  History: he plays basketball a lot and gets pain the more he plays. Afterward has pain with stairs, even walking sometimes.     Present symptoms: pain is anterior over the distal pole of the patella    Not much pain lately because he hasn't played basketball for a while.    Treatments tried to this point: rest, tylenol.    Previous knee issues:  RIGHT knee pain-- seen for this 2014    Past Medical History:   Past Medical History:   Diagnosis Date     Mantoux: positive     refuses tx     Past Surgical History:   Past Surgical History:   Procedure Laterality Date     C OPEN TX HUMERAL EPICONDYLAR FRACTURE  1/14/2011    ORIF Lt elbow epicondylar fx     HC REMOVAL DEEP IMPLANT  10/06/2011    Removal retained hardware healed Lt elbow ORIF     REMOVE HARDWARE UPPER EXTREMITY  10/6/2011    Procedure:REMOVE HARDWARE UPPER EXTREMITY; Hardware removal left elbow, mini c-arm; Surgeon:MATTHEW MADNUJANO; Location:[facility] OR     Family History:   Family History   Problem Relation Age of Onset     Heart Disease Father      Hypertension Father      Social History:   Social History     Tobacco Use     Smoking status: Never Smoker     Smokeless tobacco: Never Used     Tobacco comment: nonsmoking household   Substance Use Topics     Alcohol use: Yes     Comment: sometimes       Review of Systems:  Constitutional:  NEGATIVE for fever, chills, change in weight  Integumentary/Skin:  NEGATIVE for worrisome rashes, moles or lesions  Eyes:  NEGATIVE for vision changes or irritation  ENT/Mouth:  NEGATIVE for ear, mouth and throat problems  Resp:   "NEGATIVE for significant cough or SOB  Breast:  NEGATIVE for masses, tenderness or discharge  CV:  NEGATIVE for chest pain, palpitations or peripheral edema  GI:  NEGATIVE for nausea, abdominal pain, heartburn, or change in bowel habits  :  Negative   Musculoskeletal:  See HPI above  Neuro:  NEGATIVE for weakness, dizziness or paresthesias  Endocrine:  NEGATIVE for temperature intolerance, skin/hair changes  Heme/allergy/immune:  NEGATIVE for bleeding problems  Psychiatric:  NEGATIVE for changes in mood or affect      OBJECTIVE:  Physical Exam:  /77 (BP Location: Left arm, Patient Position: Sitting, Cuff Size: Adult Regular)   Pulse 75   Ht 1.659 m (5' 5.3\")   Wt 61.2 kg (135 lb)   SpO2 97%   BMI 22.26 kg/m    General Appearance: healthy, alert and no distress   Skin: no suspicious lesions or rashes  Neuro: Normal strength and tone, mentation intact and speech normal  Vascular: good pulses, and cappillary refill   Lymph: no lymphadenopathy   Psych:  mentation appears normal and affect normal/bright  Resp: no increased work of breathing     Left Knee Exam:  Gait: walks with normal gait  Alignment: normal   Squat: 100 % painfree.    Patellofemoral joint: no crepitations in the patellofemoral joint.  + J-sign  Negative apprehension   No excessive patellar mobility, patella tilt corrects to neutral.   Effusion: none  Mild atrophy of quads compared with right   ROM: full  Tender: distal pole of the patella   Masses: none  Ligaments:   Lachman's: stable   Anterior/Posterior drawer: stable,   Varus/Valgus stress: stable to varus and valgus stress  McMurrays: negative, but there is a pop or clunk with lateral yuliya's without pain    X-rays:  Obtained 12/13/21, reviewed in the office with the patient today:  Mild lateral tilt of the patella    MRI:    none     ASSESSMENT:   Patellar tendonitis left knee     PLAN:   We discussed the nature of the problem  Rest from jumping but still maintaining some activity " is best solution  physical therapy ordered, eccentric training, modalities  Counterforce (Chopat) strap suggested for basketball  voltaren gel, nsaids  Discussed that stem cell injections, and surgery are options in the future.    Return to clinic: as needed     MONICA العراقي MD  Dept. Orthopedic Surgery  [facility]       Again, thank you for allowing me to participate in the care of your patient.        Sincerely,        Adam العراقي MD

## 2021-12-29 NOTE — PATIENT INSTRUCTIONS
Jumpers Knee Strap (EA)  Image result for Jumpers Knee Strap (EA)  Carlene Jumper's Knee Strap is designed to improve patellar tracking and elevation by applying mild pressure on the tendon below the kneecap. The tubular insert provides uniform pressure and helps provide pain relief from Chondromalacia (irritated kneecap), Patellar Tendonitis and Osgood-Schlatter's Disease.  Brand: Carlene  Size: 1 Count (Pack of 1)  Sport: Rugby  Model Name: [id number]  Material: Nylon Spandex  Data from: [employer]     Understanding Patellofemoral Syndrome    Patellofemoral syndrome is a condition that causes pain on the front of the knee. The large bones of the upper and lower leg meet at the knee. This joint also includes a small triangle-shaped bone that rests on top of the leg bones. This is the kneecap (patella). Patellofemoral refers to the patella and the thighbone (femur). These bones are surrounded by connective tissue and muscles. Patellofemoral pain is believed to come from stress on the tissues of and around the knee. It's sometimes called runner's knee or jumper's knee because it's common in people who take part in sports.   What causes patellofemoral syndrome?  No single cause for patellofemoral pain has been found. But many things are likely to contribute to this type of knee pain. These include:     Actions that put repeated stress on the knee, such as running and squatting    Overtraining at a sport    Weak hip or thigh muscles    Normal variations in the way body parts fit together    Poor form during activities that stress the knee, such as running    A fall or blow to the knee  Symptoms of patellofemoral syndrome  Pain is a common symptom. It s often on the front of the knee, but can be around the kneecap. Pain can occur at certain times, such as when you are:     Running    Sitting for a long time with your knees bent, such as at a movie    Walking up or down stairs    Squatting  Other symptoms  may include:    A feeling of the knee catching or locking    A grinding or crackling noise in your knee  Treatment for patellofemoral syndrome  Treatment focuses on reducing pain and avoiding further injury. Treatments may include:    Rest your leg. This gives your knee time to recover. You may need to avoid or change the activity that caused the problem, such as not running for a while.    Prescription or over-the-counter medicines. These help reduce inflammation, swelling, and pain. NSAIDs (nonsteroidal anti-inflammatory drugs) are the most common medicines used. Medicines may be prescribed or bought over the counter. They may be given as pills. Or they may be put on the skin as a gel, cream, or patch.    Cold packs. These help reduce pain.    Stretches and other exercises. These can improve balance, flexibility, and strength.    A shoe insert (orthotic). This can make your knee more stable.    Elastic tape or a brace. These can make your knee more stable.    Physical therapy. This may include exercises or other treatments.    Surgery. In rare cases, if other treatments don t relieve symptoms, you may need surgery.  Possible complications of patellofemoral syndrome  If you don t give your knee time to heal, symptoms may return or get worse. Follow your healthcare provider s instructions on resting and treating your knee.   When to call your healthcare provider  Call your healthcare provider right away if you have any of these:    Fever of 100.4 F (38 C) or higher, or as directed by your provider    Chills    Pain that gets worse    Symptoms that don t get better, or get worse    New symptoms  Delaney last reviewed this educational content on 6/1/2019 2000-2021 [employer]. All rights reserved. This information is not intended as a substitute for professional medical care. Always follow your healthcare professional's instructions.

## 2022-01-13 ENCOUNTER — THERAPY VISIT (OUTPATIENT)
Dept: PHYSICAL THERAPY | Facility: CLINIC | Age: 27
End: 2022-01-13
Attending: ORTHOPAEDIC SURGERY
Payer: COMMERCIAL

## 2022-01-13 DIAGNOSIS — M76.52 JUMPER'S KNEE OF LEFT SIDE: ICD-10-CM

## 2022-01-13 PROCEDURE — 97161 PT EVAL LOW COMPLEX 20 MIN: CPT | Mod: GP

## 2022-01-13 PROCEDURE — 97110 THERAPEUTIC EXERCISES: CPT | Mod: GP

## 2022-01-13 ASSESSMENT — ACTIVITIES OF DAILY LIVING (ADL)
LIMPING: I DO NOT HAVE THE SYMPTOM
SWELLING: THE SYMPTOM AFFECTS MY ACTIVITY MODERATELY
PAIN: THE SYMPTOM AFFECTS MY ACTIVITY SEVERELY
WEAKNESS: I DO NOT HAVE THE SYMPTOM
GIVING WAY, BUCKLING OR SHIFTING OF KNEE: THE SYMPTOM AFFECTS MY ACTIVITY SLIGHTLY
STIFFNESS: THE SYMPTOM AFFECTS MY ACTIVITY SEVERELY

## 2022-01-13 NOTE — PROGRESS NOTES
[facility]    OUTPATIENT Physical Therapy ORTHOPEDIC EVALUATION  PLAN OF TREATMENT FOR OUTPATIENT REHABILITATION  (COMPLETE FOR INITIAL CLAIMS ONLY)  Patient's Last Name, First Name, M.I.  YOB: 1995  Ernesto Andrea    Provider s Name:  [facility]   Medical Record No.  [id number]   Start of Care Date:  01/13/22   Onset Date:   12/29/21 (MD order)   Type:     _X__PT   ___OT Medical Diagnosis:    Encounter Diagnosis   Name Primary?     Jumper's knee of left side         Treatment Diagnosis:  L patellar tendinitis        Goals:     01/13/22 0500   Body Part   Goals listed below are for L knee   Goal #1   Goal #1 return to sport   Previous Functional Level playing basketball 5x/week   Current Functional Level not playing basketball   Performance level d/t knee pain   STG [employer] Performance able to play basketball 2x/week   Performance level w/ no increase in knee pain impacting ability to complete stairs and ambulate   Rationale return to sport painfree;to promote a healthy and active lifestyle  (safe community ambulation)   Due date 02/10/22   LTG [employer] able to play basketball 5x/week   Performance Level w/ no increase in knee pain impacting ability to complete stairs and ambulate   Rationale return to sport painfree;to promote a healthy and active lifestyle  (and safe community ambulation)   Due Date 03/10/22       Therapy Frequency:  1x/week  Predicted Duration of Therapy Intervention:  for 2 weeks tapering to 2x/mo for 6 weeks    Zohreh Brower PT                 I CERTIFY THE NEED FOR THESE SERVICES FURNISHED UNDER        THIS PLAN OF TREATMENT AND WHILE UNDER MY CARE     (Physician attestation of this document indicates review and certification of the therapy plan).                       Certification Date From:  01/13/22   Certification Date To:   04/12/22    Referring Provider:  Adam العراقي    Initial Assessment        See Epic Evaluation SOC Date: 01/13/22

## 2022-01-13 NOTE — PROGRESS NOTES
Physical Therapy Initial Evaluation  Therapist Impression: Ernesto is a 26 year old year old male referred to physical therapy by Dr. Adam العراقي for treatment of L knee pain. Patient presents to physical therapy with c/o the above. Subjective history and objective findings are consistent with patellar tendinitis. Due to these impairments, patient has difficulty with running, jumping, and sometimes stairs and walking. Patient will benefit from skilled PT emphasizing LE strengthening with progression to plyometrics and LE stability training to address impairments/limitations in order to reach patient's goals, facilitate return to prior level of function, and maximize participation.    KEY FINDINGS:  1. 5/5 LE strength  2. TTP over patellar tendon  3. Fair control w/ SL squatting    Subjective:  The history is provided by the patient. No  was used.   Patient Health History    Problem began: 12/29/2021 (MD order).   Problem occurred: playing basketball   Pain is reported as 4/10 on pain scale.  General health as reported by patient is good.  Pertinent medical history includes: none.   Red flags:  None as reported by patient.  Medical allergies: none.   Surgeries include:  None.        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   Primary job tasks include:  Pushing/pulling and prolonged standing.                  Therapist Generated HPI Evaluation  Problem details: Pt presents with L knee pain that started about 6 months ago with playing basketball. No big change in activity levels when the pain started. Initially just noticed with jumping, landing. Sometimes stairs bothersome, rarely is walking bothersome. No history of knee issues per pt. Recently hasn't been as bothersome because he hasn't been playing as much, was playing about 5x/week, now not really at all. Hasn't tried knee band yet. No physical activity/exercise outside of basketball and standing at work. Goal is to return to playing.     Xray  12/13/21 IMPRESSION: Normal left knee. Normal joint spaces and alignment. No fracture or joint effusion. .         Type of problem:  Left knee.    This is a chronic condition.  Condition occurred with:  Repetition/overuse.  Where condition occurred: during recreation/sport.  Patient reports pain:  Sub patellar.  Pain is described as aching and is intermittent.  Pain timing: not time dependent.  Since onset symptoms are gradually improving.  Associated symptoms:  Buckling/giving out and loss of motion/stiffness. Symptoms are exacerbated by bending/squatting (Sitting with increased knee flexion for more than 15 min, jumping, running)  and relieved by ice and rest.  Special tests included:  X-ray (see above).    Restrictions due to condition include:  Working in normal job without restrictions.  Barriers include:  None as reported by patient.                        Objective:  System                                           Hip Evaluation  Hip PROM:  Hip PROM:  Left Hip:    Normal  Right Hip:  Normal                          Hip Strength:    Flexion:   Left: 5/5   Pain:  Right: 5/5   Pain:                    Extension:  Left: 5/5  Pain:Right: 5/5    Pain:    Abduction:  Left: 5/5     Pain:Right: 5/5    Pain:    Internal Rotation:  Left: 5/5    Pain:Right: 5/5   Pain:  External Rotation:  Left: 5/5   Pain:  Right: 5/5   Pain:  Knee Flexion:  Left: 5/5   Pain:Right: 5/5   Pain:  Knee Extension:  Left: 5/5   Pain:Right: 5/5    Pain:                 Knee Evaluation:  ROM:  Arom wnl knee: AROM knee extension increased patellar lateralization R>L. Normal patellar alignment at 90 deg knee flexion.    AROM    Hyperextension:  Left:  5    Right: 5  Extension:  Left: 0    Right:  0  Flexion: Left: 130    Right: 130          Ligament Testing:  Normal                Special Tests:     Left knee negative for the following special tests:  Meninscal    Right knee negative for the following special tests:  Meniscal  Palpation:   Palpation of knee: Slight tenderness over L patellar tendon, but pt reports this would have been much more tender when sxs were worse.      Edema:  Normal      Functional Testing:  : SL balance intact eyes open.        Quad:    Single Leg Squat:  Left:        80 deg  Mild loss of control, femoral IR and excessive anterior knee excursion  Right:      90 deg  Mild loss of control, femoral IR and excessive anterior knee excursion  Bilateral Leg Squat:   Normal control              General     ROS    Assessment/Plan:    Patient is a 26 year old male with left side knee complaints.    Patient has the following significant findings with corresponding treatment plan.                Diagnosis 1:  L patellar tendinitis  Pain -  hot/cold therapy, manual therapy, splint/taping/bracing/orthotics, self management, education and home program  Decreased strength - therapeutic exercise, therapeutic activities and home program  Impaired balance - neuro re-education, therapeutic activities and home program  Decreased function - therapeutic activities, home program and functional performance testing    Therapy Evaluation Codes:   Cumulative Therapy Evaluation is: Low complexity.    Previous and current functional limitations:  (See Goal Flow Sheet for this information)    Short term and Long term goals: (See Goal Flow Sheet for this information)     Communication ability:  Patient appears to be able to clearly communicate and understand verbal and written communication and follow directions correctly.  Treatment Explanation - The following has been discussed with the patient:   RX ordered/plan of care  Anticipated outcomes  Possible risks and side effects  This patient would benefit from PT intervention to resume normal activities.   Rehab potential is excellent.    Frequency:  1 X week, once daily  Duration:  for 2 weeks tapering to 2x/month for 6 weeks  Discharge Plan:  Achieve all LTG.  Independent in home treatment program.  Reach  maximal therapeutic benefit.    Please refer to the daily flowsheet for treatment today, total treatment time and time spent performing 1:1 timed codes.

## 2022-03-13 ENCOUNTER — HEALTH MAINTENANCE LETTER (OUTPATIENT)
Age: 27
End: 2022-03-13

## 2023-01-08 ENCOUNTER — HEALTH MAINTENANCE LETTER (OUTPATIENT)
Age: 28
End: 2023-01-08

## 2023-04-23 ENCOUNTER — HEALTH MAINTENANCE LETTER (OUTPATIENT)
Age: 28
End: 2023-04-23

## 2024-06-30 ENCOUNTER — HEALTH MAINTENANCE LETTER (OUTPATIENT)
Age: 29
End: 2024-06-30

## 2025-03-27 ENCOUNTER — OFFICE VISIT (OUTPATIENT)
Dept: FAMILY MEDICINE | Facility: CLINIC | Age: 30
End: 2025-03-27
Payer: COMMERCIAL

## 2025-03-27 VITALS
BODY MASS INDEX: 25.09 KG/M2 | DIASTOLIC BLOOD PRESSURE: 86 MMHG | SYSTOLIC BLOOD PRESSURE: 120 MMHG | HEIGHT: 65 IN | RESPIRATION RATE: 16 BRPM | OXYGEN SATURATION: 97 % | WEIGHT: 150.6 LBS | HEART RATE: 67 BPM | TEMPERATURE: 97 F

## 2025-03-27 DIAGNOSIS — F32.1 CURRENT MODERATE EPISODE OF MAJOR DEPRESSIVE DISORDER WITHOUT PRIOR EPISODE (H): Primary | ICD-10-CM

## 2025-03-27 DIAGNOSIS — R41.840 CONCENTRATION DEFICIT: ICD-10-CM

## 2025-03-27 DIAGNOSIS — F41.9 ANXIETY: ICD-10-CM

## 2025-03-27 RX ORDER — CETIRIZINE HYDROCHLORIDE 10 MG/1
10 TABLET ORAL DAILY
COMMUNITY

## 2025-03-27 RX ORDER — ESCITALOPRAM OXALATE 10 MG/1
10 TABLET ORAL DAILY
Qty: 30 TABLET | Refills: 0 | Status: SHIPPED | OUTPATIENT
Start: 2025-03-27

## 2025-03-27 ASSESSMENT — ANXIETY QUESTIONNAIRES
5. BEING SO RESTLESS THAT IT IS HARD TO SIT STILL: MORE THAN HALF THE DAYS
3. WORRYING TOO MUCH ABOUT DIFFERENT THINGS: NEARLY EVERY DAY
IF YOU CHECKED OFF ANY PROBLEMS ON THIS QUESTIONNAIRE, HOW DIFFICULT HAVE THESE PROBLEMS MADE IT FOR YOU TO DO YOUR WORK, TAKE CARE OF THINGS AT HOME, OR GET ALONG WITH OTHER PEOPLE: EXTREMELY DIFFICULT
GAD7 TOTAL SCORE: 20
1. FEELING NERVOUS, ANXIOUS, OR ON EDGE: NEARLY EVERY DAY
6. BECOMING EASILY ANNOYED OR IRRITABLE: NEARLY EVERY DAY
2. NOT BEING ABLE TO STOP OR CONTROL WORRYING: NEARLY EVERY DAY
7. FEELING AFRAID AS IF SOMETHING AWFUL MIGHT HAPPEN: NEARLY EVERY DAY
GAD7 TOTAL SCORE: 20

## 2025-03-27 ASSESSMENT — PATIENT HEALTH QUESTIONNAIRE - PHQ9
10. IF YOU CHECKED OFF ANY PROBLEMS, HOW DIFFICULT HAVE THESE PROBLEMS MADE IT FOR YOU TO DO YOUR WORK, TAKE CARE OF THINGS AT HOME, OR GET ALONG WITH OTHER PEOPLE: EXTREMELY DIFFICULT
SUM OF ALL RESPONSES TO PHQ QUESTIONS 1-9: 19
5. POOR APPETITE OR OVEREATING: NEARLY EVERY DAY
SUM OF ALL RESPONSES TO PHQ QUESTIONS 1-9: 19

## 2025-03-27 ASSESSMENT — PAIN SCALES - GENERAL: PAINLEVEL_OUTOF10: NO PAIN (0)

## 2025-03-27 NOTE — PROGRESS NOTES
Assessment & Plan     Current moderate episode of major depressive disorder without prior episode (H)  Advised   SEE EPIC care orders  The potential side effects of this medication have been discussed with the patient.  Call if any significant problems with these are experienced.  Follow up 1 week if not better/sooner if worse   - escitalopram (LEXAPRO) 10 MG tablet; Take 1 tablet (10 mg) by mouth daily.  Dap discussed   Follow up 1 month with  PCP  Anxiety  Advised   - escitalopram (LEXAPRO) 10 MG tablet; Take 1 tablet (10 mg) by mouth daily.    Concentration deficit  Referral  ADD  EVAL  - Adult Mental Health  Referral; Future  Regular Exercise  Discussed Marijuana can cause anxiety  Also needs to Limit alcohol    Discussed about Genital herpes  [place] discussed  Advised wear condoms    Depression Screening Follow Up        3/27/2025    10:06 AM   PHQ   PHQ-9 Total Score 19    Q9: Thoughts of better off dead/self-harm past 2 weeks Not at all       Patient-reported         3/27/2025    10:06 AM   Last PHQ-9   1.  Little interest or pleasure in doing things 2   2.  Feeling down, depressed, or hopeless 3   3.  Trouble falling or staying asleep, or sleeping too much 3   4.  Feeling tired or having little energy 3   5.  Poor appetite or overeating 0   6.  Feeling bad about yourself 3   7.  Trouble concentrating 3   8.  Moving slowly or restless 2   Q9: Thoughts of better off dead/self-harm past 2 weeks 0   PHQ-9 Total Score 19        Patient-reported         Follow Up Actions Taken  Depression Action Plan reviewed with patient.  Mental Health Referral placed       Regular exercise    Kenney Orozco is a 29 year old, presenting for the following health issues:  Depression, Anxiety, Sleep Problem, and Sexual Problem (Question about partner. She has herpes )        3/27/2025    10:10 AM   Additional Questions   Roomed by Rain     History of Present Illness       Reason for visit:  Multiple reasons   He is  "taking medications regularly.      Chief Complaint   Patient presents with    Depression    Anxiety    Sleep Problem    Sexual Problem     Question about partner. She has herpes          Abnormal Mood Symptoms-sees a THerapist  Onset/Duration: first noticed about 10 years ago. Has not be treated  Description:   Depression (if yes, do PHQ-9): YES  Anxiety (if yes, do MAT-7): YES  Accompanying Signs & Symptoms:  Still participating in activities that you used to enjoy: Somewhat  Fatigue: YES  Irritability: YES  Difficulty concentrating: YES  Changes in appetite: No  Problems with sleep: YES  Heart racing/beating fast: YES  Abnormally elevated, expansive, or irritable mood: No  Persistently increased activity or energy: No  Thoughts of hurting yourself or others: No  History:  Recent stress or major life event: No  Prior depression or anxiety: Yes but never treated  Family history of depression or anxiety: YES  Alcohol/drug use: YES Alcohol and weed  Difficulty sleeping: YES  Precipitating or alleviating factors: None  Therapies tried and outcome: none  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  Very hard to get a Job in this  Alcohol 1-2 times a week -3-5 at a time  Marijuana ones a month  Depression -mom and Brother      3/27/2025    10:06 AM   PHQ   PHQ-9 Total Score 19    Q9: Thoughts of better off dead/self-harm past 2 weeks Not at all       Patient-reported          No data to display            Pt in a new Relationship  Pts Girlfriend has   has  Genital herpes   She has infrequent Outbreaks and on Valtrex  Pt has questions        Review of Systems  Constitutional, neuro, ENT, endocrine, pulmonary, cardiac, gastrointestinal, genitourinary, musculoskeletal, integument and psychiatric systems are negative, except as otherwise noted.      Objective    /86   Pulse 67   Temp 97  F (36.1  C) (Temporal)   Resp 16   Ht 1.663 m (5' 5.47\")   Wt 68.3 kg (150 lb 9.6 oz)   SpO2 97%   " BMI 24.70 kg/m    Body mass index is 24.7 kg/m .  Physical Exam   GENERAL: alert and no distress  NECK: no adenopathy, no asymmetry, masses, or scars  RESP: lungs clear to auscultation - no rales, rhonchi or wheezes  CV: regular rate and rhythm, normal S1 S2, no S3 or S4, no murmur, click or rub, no peripheral edema  ABDOMEN: soft, nontender, no hepatosplenomegaly, no masses and bowel sounds normal  MS: no gross musculoskeletal defects noted, no edema  PSYCH: mentation appears normal, affect normal/bright            Signed Electronically by: Natalia Horvath MD

## 2025-03-27 NOTE — LETTER
My Depression Action Plan  Name: Beckie Andrea   Date of Birth 1995  Date: 3/27/2025    My doctor: Ori Stratton   My clinic: [facility]  [address]  [place] [address]  [phone]            GREEN    ZONE   Good Control    What it looks like:   Things are going generally well. You have normal ups and downs. You may even feel depressed from time to time, but bad moods usually last less than a day.   What you need to do:  Continue to care for yourself (see self care plan)  Check your depression survival kit and update it as needed  Follow your physician s recommendations including any medication.  Do not stop taking medication unless you consult with your physician first.             YELLOW         ZONE Getting Worse    What it looks like:   Depression is starting to interfere with your life.   It may be hard to get out of bed; you may be starting to isolate yourself from others.  Symptoms of depression are starting to last most all day and this has happened for several days.   You may have suicidal thoughts but they are not constant.   What you need to do:     Call your care team. Your response to treatment will improve if you keep your care team informed of your progress. Yellow periods are signs an adjustment may need to be made.     Continue your self-care.  Just get dressed and ready for the day.  Don't give yourself time to talk yourself out of it.    Talk to someone in your support network.    Open up your Depression Self-Care Plan/Wellness Kit.             RED    ZONE Medical Alert - Get Help    What it looks like:   Depression is seriously interfering with your life.   You may experience these or other symptoms: You can t get out of bed most days, can t work or engage in other necessary activities, you have trouble taking care of basic hygiene, or basic responsibilities, thoughts of suicide or death that will not go away, self-injurious behavior.     What  you need to do:  Call your care team and request a same-day appointment. If they are not available (weekends or after hours) call your local crisis line, emergency room or 911.          Depression Self-Care Plan / Wellness Kit    Many people find that medication and therapy are helpful treatments for managing depression. In addition, making small changes to your everyday life can help to boost your mood and improve your wellbeing. Below are some tips for you to consider. Be sure to talk with your medical provider and/or behavioral health consultant if your symptoms are worsening or not improving.     Sleep   Sleep hygiene  means all of the habits that support good, restful sleep. It includes maintaining a consistent bedtime and wake time, using your bedroom only for sleeping or sex, and keeping the bedroom dark and free of distractions like a computer, smartphone, or television.     Develop a Healthy Routine  Maintain good hygiene. Get out of bed in the morning, make your bed, brush your teeth, take a shower, and get dressed. Don t spend too much time viewing media that makes you feel stressed. Find time to relax each day.    Exercise  Get some form of exercise every day. This will help reduce pain and release endorphins, the  feel good  chemicals in your brain. It can be as simple as just going for a walk or doing some gardening, anything that will get you moving.      Diet  Strive to eat healthy foods, including fruits and vegetables. Drink plenty of water. Avoid excessive sugar, caffeine, alcohol, and other mood-altering substances.     Stay Connected with Others  Stay in touch with friends and family members.    Manage Your Mood  Try deep breathing, massage therapy, biofeedback, or meditation. Take part in fun activities when you can. Try to find something to smile about each day.     Psychotherapy  Be open to working with a therapist if your provider recommends it.     Medication  Be sure to take your  medication as prescribed. Most anti-depressants need to be taken every day. It usually takes several weeks for medications to work. Not all medicines work for all people. It is important to follow-up with your provider to make sure you have a treatment plan that is working for you. Do not stop your medication abruptly without first discussing it with your provider.    Crisis Resources   These hotlines are for both adults and children. They and are open 24 hours a day, 7 days a week unless noted otherwise.    National Suicide Prevention Lifeline   988 or 7-903-016-[facility] (4907)    Crisis Text Line    www.crisistextline.org  Text HOME to 754224 from anywhere in the [place], anytime, about any type of crisis. A live, trained crisis counselor will receive the text and respond quickly.    [facility] Lifeline for LGBTQ Youth  A national crisis intervention and suicide lifeline for LGBTQ youth under 25. Provides a safe place to talk without judgement. Call 1-438.799.9300; text START to 118669 or visit www.thedoggylootvorproject.org to talk to a trained counselor.    For [facility] crisis numbers, visit the [facility] website at:  https://[place].gov/dhs/people-we-serve/adults/health-care/mental-health/resources/crisis-contacts.jsp

## 2025-07-13 ENCOUNTER — HEALTH MAINTENANCE LETTER (OUTPATIENT)
Age: 30
End: 2025-07-13